# Patient Record
Sex: FEMALE | URBAN - METROPOLITAN AREA
[De-identification: names, ages, dates, MRNs, and addresses within clinical notes are randomized per-mention and may not be internally consistent; named-entity substitution may affect disease eponyms.]

---

## 2023-11-22 ENCOUNTER — TELEPHONE (OUTPATIENT)
Age: 53
End: 2023-11-22

## 2023-11-22 DIAGNOSIS — Z86.79 HISTORY OF SUBARACHNOID HEMORRHAGE: Primary | ICD-10-CM

## 2023-11-22 DIAGNOSIS — R51.9 NONINTRACTABLE HEADACHE, UNSPECIFIED CHRONICITY PATTERN, UNSPECIFIED HEADACHE TYPE: ICD-10-CM

## 2023-11-22 DIAGNOSIS — I65.23 BILATERAL CAROTID ARTERY STENOSIS: ICD-10-CM

## 2023-11-22 NOTE — TELEPHONE ENCOUNTER
Patient called the office today requesting to schedule an appointment. Patient is a previous [facility] patient last seen by Prachi TAVAREZ on 9/22/22. Office notes state for patient to follow up in 1 year with CTA head/neck prior to appointment. CTA head/neck ordered and faxed to [facility] per request. Informed patient to contact our office once the imaging is scheduled so that we can schedule an appointment in our office. Images requested from [facility] and are in PACS for review.

## 2023-11-27 NOTE — TELEPHONE ENCOUNTER
Spoke to patient. CTA head/neck is scheduled on 12/6/23 through [facility]. Appointment scheduled on Thursday, 12/14/23 @ 11 am with Negar TAVAREZ. Appointment reminder letter sent to patient via mail along with map/directions to our office. Patient agreeable and verbalized understanding. Nothing further needed at this time.

## 2023-12-04 ENCOUNTER — TELEPHONE (OUTPATIENT)
Age: 53
End: 2023-12-04

## 2023-12-04 DIAGNOSIS — I65.23 BILATERAL CAROTID ARTERY STENOSIS: Primary | ICD-10-CM

## 2023-12-04 NOTE — TELEPHONE ENCOUNTER
----- Message from BETSY Mancini CNP sent at 12/4/2023  3:22 PM EST -----  Regarding: RE: CTA head/neck denied by [facility]  Lets start out with a carotid ultrasound, diagnosis carotid stenosis. If she needs anything else I will add at her office visit. Thanks  [place]  ----- Message -----  From: Jenn Palacios LPN  Sent: 90/1/7830  10:03 AM EST  To: BETSY Mancini CNP  Subject: CTA head/neck denied by Dillan Mckeon from [address] [place] [address] called our office stating CTA head/neck order denied by [facility]. Can do a [id number] review and reference case # [id number]. Please advise - thank you!

## 2023-12-04 NOTE — TELEPHONE ENCOUNTER
Spoke to patient and informed her of Zenobia Gitelman APRN-CNP recommendations. Patient agreeable and verbalized understanding. Order faxed to [facility] Scheduling per patient request. Informed patient if she can't get the ultrasound scheduled before her appointment with Hamida Patel on 12/14/23, we will need to reschedule. She was agreeable.

## 2023-12-04 NOTE — TELEPHONE ENCOUNTER
Susannah Fabry from [address] [place] [address] called our office stating CTA head/neck order denied by [facility]. Can do a [id number] review and reference case # [id number]. Staff message sent to [facility] APRN-CNP.

## 2023-12-05 NOTE — TELEPHONE ENCOUNTER
Carotid ultrasound scheduled on 12/12/23 @ 11 am through [facility]. Will request images to be pushed through PACS for appointment with Sohail TAVAREZ on 12/14/23.

## 2023-12-11 RX ORDER — PREDNISONE 5 MG/1
5 TABLET ORAL DAILY
COMMUNITY
Start: 2022-12-07 | End: 2023-12-14

## 2023-12-11 RX ORDER — ASPIRIN 81 MG/1
81 TABLET ORAL DAILY
COMMUNITY
Start: 2023-11-03 | End: 2024-02-01

## 2023-12-11 RX ORDER — CLONAZEPAM 0.5 MG/1
0.25 TABLET ORAL EVERY MORNING
COMMUNITY
Start: 2022-09-20

## 2023-12-11 RX ORDER — GABAPENTIN 300 MG/1
600 CAPSULE ORAL NIGHTLY
COMMUNITY
Start: 2022-07-07

## 2023-12-11 RX ORDER — TRAMADOL HYDROCHLORIDE 50 MG/1
TABLET ORAL
COMMUNITY
Start: 2023-11-30 | End: 2023-12-11

## 2023-12-11 RX ORDER — LORATADINE 10 MG/1
10 TABLET ORAL DAILY
COMMUNITY
Start: 2021-07-04

## 2023-12-11 RX ORDER — CLONIDINE 0.1 MG/24H
PATCH, EXTENDED RELEASE TRANSDERMAL
COMMUNITY
Start: 2022-11-30 | End: 2023-12-14

## 2023-12-11 RX ORDER — VENLAFAXINE HYDROCHLORIDE 150 MG/1
150 TABLET, EXTENDED RELEASE ORAL DAILY
COMMUNITY
Start: 2021-02-05

## 2023-12-11 RX ORDER — ROSUVASTATIN CALCIUM 40 MG/1
TABLET, COATED ORAL
COMMUNITY
Start: 2023-10-31 | End: 2023-12-11

## 2023-12-11 RX ORDER — HYDROCODONE BITARTRATE AND ACETAMINOPHEN 5; 325 MG/1; MG/1
TABLET ORAL
COMMUNITY
Start: 2023-01-31 | End: 2023-12-14

## 2023-12-11 RX ORDER — CYANOCOBALAMIN 1000 UG/ML
1000 INJECTION, SOLUTION INTRAMUSCULAR; SUBCUTANEOUS
COMMUNITY
Start: 2023-10-31

## 2023-12-11 RX ORDER — ALBUTEROL SULFATE 90 UG/1
2 AEROSOL, METERED RESPIRATORY (INHALATION) EVERY 6 HOURS PRN
COMMUNITY
Start: 2021-07-01

## 2023-12-11 RX ORDER — BUTALBITAL, ACETAMINOPHEN AND CAFFEINE 50; 325; 40 MG/1; MG/1; MG/1
1 TABLET ORAL EVERY 6 HOURS PRN
COMMUNITY
Start: 2022-09-22 | End: 2023-12-14 | Stop reason: SDUPTHER

## 2023-12-11 RX ORDER — METOPROLOL SUCCINATE 50 MG/1
1 TABLET, EXTENDED RELEASE ORAL DAILY
COMMUNITY
Start: 2023-10-11

## 2023-12-11 RX ORDER — PRAVASTATIN SODIUM 10 MG
10 TABLET ORAL NIGHTLY
COMMUNITY
End: 2023-12-14

## 2023-12-11 RX ORDER — ACETAMINOPHEN 500 MG
1000 TABLET ORAL EVERY 8 HOURS
COMMUNITY
Start: 2022-10-10

## 2023-12-11 RX ORDER — OMEPRAZOLE 20 MG/1
20 CAPSULE, DELAYED RELEASE ORAL DAILY
COMMUNITY

## 2023-12-11 RX ORDER — HYDROXYCHLOROQUINE SULFATE 200 MG/1
TABLET, FILM COATED ORAL DAILY
COMMUNITY
Start: 2022-12-07 | End: 2023-12-14

## 2023-12-11 RX ORDER — TIZANIDINE 4 MG/1
6 TABLET ORAL NIGHTLY PRN
COMMUNITY
Start: 2023-02-13

## 2023-12-11 NOTE — PROGRESS NOTES
Outpatient Vascular Neurology Service Consult Note     Patient Name: Tereza Spears  : 1970        Subjective:   Reason for consult:   Tereza Spears is a 48 y.o. female presenting to establish care. She previously followed with Dr. Harpreet Zhang at [facility] with history of non-aneurysmal SAH in 2019 and headaches. The patient reports recent vision changes including episodes of blurred vision. She denies loss of vision. She states she has episodes of \"increased blinking. \"  She states that with her vision changes she has a new sharp throbbing pain located at the crown of her head. She denies localizing neurological symptoms. She is still taking Fioricet and Tizanidine for headache management. Cerebral angiogram 2019:  No aneurysm avm or fistula   Subtle luminal irregularities in bilateral mca, pca and erlinda's   Slight bulbous appearance to distal basilar artery   Her last cerebral angiogram in  showed mild atherosclerotic changes in the left ICA. No aneurysm, AVM or fistula. No evidence of vasculopathy such as vasculitis. In  she had a L2-5 discectomies, laminectomies, facetectomies, foraminotomies and instrumented fusion   She also had a C4-5, C5-6, C6-7 Anterior cervical  discectomy and fusion on 2022   Recent diagnosis of fibromyalgia for which she follows with rheumatology . She reports increased stressors associated with chronic pain and her marriage. She follows with a psychiatrist and is on Effexor. She is tearful in the office today. She denies suicidal ideation. CUS demonstrates less than 1-49% stenosis b/l        No past medical history on file. No past surgical history on file.   Not on File  Social History     Socioeconomic History    Marital status: Not on file     Spouse name: Not on file    Number of children: Not on file    Years of education: Not on file    Highest education level: Not on file   Occupational History    Not on file   Tobacco Use    Smoking

## 2023-12-14 ENCOUNTER — INITIAL CONSULT (OUTPATIENT)
Age: 53
End: 2023-12-14
Payer: COMMERCIAL

## 2023-12-14 VITALS
SYSTOLIC BLOOD PRESSURE: 154 MMHG | HEIGHT: 65 IN | BODY MASS INDEX: 32.15 KG/M2 | WEIGHT: 193 LBS | DIASTOLIC BLOOD PRESSURE: 84 MMHG | RESPIRATION RATE: 17 BRPM | HEART RATE: 83 BPM | OXYGEN SATURATION: 97 %

## 2023-12-14 DIAGNOSIS — F32.A DEPRESSION, UNSPECIFIED DEPRESSION TYPE: ICD-10-CM

## 2023-12-14 DIAGNOSIS — R51.9 INTRACTABLE HEADACHE, UNSPECIFIED CHRONICITY PATTERN, UNSPECIFIED HEADACHE TYPE: Primary | ICD-10-CM

## 2023-12-14 DIAGNOSIS — Z86.79 HISTORY OF SUBARACHNOID HEMORRHAGE: ICD-10-CM

## 2023-12-14 DIAGNOSIS — H53.8 BLURRED VISION: ICD-10-CM

## 2023-12-14 PROCEDURE — 99203 OFFICE O/P NEW LOW 30 MIN: CPT | Performed by: NURSE PRACTITIONER

## 2023-12-14 PROCEDURE — 3017F COLORECTAL CA SCREEN DOC REV: CPT | Performed by: NURSE PRACTITIONER

## 2023-12-14 PROCEDURE — G8417 CALC BMI ABV UP PARAM F/U: HCPCS | Performed by: NURSE PRACTITIONER

## 2023-12-14 PROCEDURE — G8427 DOCREV CUR MEDS BY ELIG CLIN: HCPCS | Performed by: NURSE PRACTITIONER

## 2023-12-14 PROCEDURE — 4004F PT TOBACCO SCREEN RCVD TLK: CPT | Performed by: NURSE PRACTITIONER

## 2023-12-14 PROCEDURE — G8484 FLU IMMUNIZE NO ADMIN: HCPCS | Performed by: NURSE PRACTITIONER

## 2023-12-14 RX ORDER — TRAMADOL HYDROCHLORIDE 50 MG/1
1 TABLET ORAL DAILY PRN
COMMUNITY

## 2023-12-14 RX ORDER — ROSUVASTATIN CALCIUM 40 MG/1
40 TABLET, COATED ORAL DAILY
COMMUNITY
Start: 2023-10-31

## 2023-12-14 RX ORDER — BUTALBITAL, ACETAMINOPHEN AND CAFFEINE 50; 325; 40 MG/1; MG/1; MG/1
1 TABLET ORAL EVERY 6 HOURS PRN
Qty: 15 TABLET | Refills: 3 | Status: SHIPPED | OUTPATIENT
Start: 2023-12-14

## 2023-12-28 ENCOUNTER — TELEPHONE (OUTPATIENT)
Age: 53
End: 2023-12-28

## 2023-12-28 NOTE — TELEPHONE ENCOUNTER
Per [facility], the CTA Head ordered for this patient is pending denial for a peer to peer as the clinical information does not support medical necessity for the ordered test. All available clinical has been submitted at this time. The denial reasoning has been uploaded into the media tab for you review.    Peer to peer available  #   Tracking# [id number]    Please advise

## 2024-01-24 ENCOUNTER — HOSPITAL ENCOUNTER (OUTPATIENT)
Dept: CT IMAGING | Age: 54
Discharge: HOME | End: 2024-01-24
Payer: COMMERCIAL

## 2024-01-24 DIAGNOSIS — Z86.79 HISTORY OF SUBARACHNOID HEMORRHAGE: ICD-10-CM

## 2024-01-24 DIAGNOSIS — H53.8 BLURRED VISION: ICD-10-CM

## 2024-01-24 DIAGNOSIS — R51.9 INTRACTABLE HEADACHE, UNSPECIFIED CHRONICITY PATTERN, UNSPECIFIED HEADACHE TYPE: ICD-10-CM

## 2024-01-24 PROCEDURE — 2580000003 HC RX 258: Performed by: NURSE PRACTITIONER

## 2024-01-24 PROCEDURE — 6360000004 HC RX CONTRAST MEDICATION: Performed by: NURSE PRACTITIONER

## 2024-01-24 PROCEDURE — 70496 CT ANGIOGRAPHY HEAD: CPT

## 2024-01-24 RX ORDER — SODIUM CHLORIDE 0.9 % (FLUSH) 0.9 %
20 SYRINGE (ML) INJECTION
Status: COMPLETED | OUTPATIENT
Start: 2024-01-24 | End: 2024-01-24

## 2024-01-24 RX ADMIN — IOPAMIDOL 80 ML: 755 INJECTION, SOLUTION INTRAVENOUS at 12:27

## 2024-01-24 RX ADMIN — SODIUM CHLORIDE, PRESERVATIVE FREE 20 ML: 5 INJECTION INTRAVENOUS at 12:30
